# Patient Record
Sex: FEMALE | Race: WHITE | Employment: FULL TIME | URBAN - METROPOLITAN AREA
[De-identification: names, ages, dates, MRNs, and addresses within clinical notes are randomized per-mention and may not be internally consistent; named-entity substitution may affect disease eponyms.]

---

## 2017-11-01 ENCOUNTER — HOSPITAL ENCOUNTER (OUTPATIENT)
Dept: PHYSICAL THERAPY | Age: 52
Discharge: HOME | End: 2017-11-01
Payer: COMMERCIAL

## 2017-11-01 PROCEDURE — 97162 PT EVAL MOD COMPLEX 30 MIN: CPT

## 2017-11-01 PROCEDURE — 97110 THERAPEUTIC EXERCISES: CPT
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
## 2017-11-01 NOTE — PROGRESS NOTES
PHYSICAL THERAPY - DAILY TREATMENT NOTE    Patient Name: Blank Kline        Date: 2017  : 1965    Patient  Verified: yes  Visit #:   1     Insurance: Payor: Tilman Bad / Plan: [address] / Product Type: Commerical /      In time: 310 Out time: 355   Total Treatment Time: 45     Medicare Time Tracking (below)   Total Timed Codes (min):   1:1 Treatment Time:       TREATMENT AREA =  Neck pain [M54.2]  Left shoulder pain [M25.512]  Concussion [S06.0X9A]    SUBJECTIVE  Pain Level (on 0 to 10 scale):  4  / 10   Medication Changes/New allergies or changes in medical history, any new surgeries or procedures?     NO    If yes, update Summary List   Subjective Functional Status/Changes:  []  No changes reported     See EVAL/ POC         OBJECTIVE  Modalities Rationale: to decrease pain, edema, neural compromise in order to perform ADLs/ rest with improved ease        min [] Estim, type/location:                                      []  att     []  unatt     []  w/US     []  w/ice    []  w/heat    min []  Mechanical Traction: type/lbs                   []  pro   []  sup   []  int   []  cont    []  before manual    []  after manual    min []  Ultrasound, settings/location:      min []  Iontophoresis w/ dexamethasone, location:                                               []  take home patch       []  in clinic    min []  Ice     []  Heat    location/position:     min []  Vasopneumatic Device, press/temp:     min []  Other:    [] Skin assessment post-treatment (if applicable):    []  intact    []  redness- no adverse reaction     []redness  adverse reaction:        20 min Therapeutic Exercise:  [x]  See flow sheet   Rationale:      increase ROM and increase strength to improve the patients ability to perform ADLs      min Manual Therapy:    Rationale:      decrease pain, increase ROM, increase tissue extensibility and decrease trigger points to improve patient's ability to perform ADLs     min Neuromuscular Re-ed:    Rationale:    improve coordination, improve balance, increase proprioception and improved posture, improve motor control to improve the patients ability to perform ADLs     min Gait Training:    Rationale:       min Patient Education:  YES  Reviewed HEP   []  Progressed/Changed HEP based on:         Other Objective/Functional Measures:    See EVAL/ POC     Post Treatment Pain Level (on 0 to 10) scale:   4 / 10     ASSESSMENT      [x]  See POC     PLAN  [x]  Upgrade activities as tolerated  Continue plan of care: yes   []  Discharge due to :    []  Other:      Therapist: Sadie Brandt PT    Date: 11/1/2017 Time: 5:53 PM     Future Appointments  Date Time Provider Josie Solis   11/6/2017 2:30 PM Sadie Brandt PT REHAB CENTER AT [facility]   11/8/2017 3:00 PM Jl Du REHAB CENTER AT [facility]   11/13/2017 3:00 PM Jl Du REHAB CENTER AT [facility]   11/15/2017 3:30 PM Sadie Brandt PT REHAB CENTER AT [facility]   11/21/2017 3:30 PM Jl Du REHAB CENTER AT [facility]   11/27/2017 3:30 PM Brant Matthews PT REHAB CENTER AT [facility]   11/29/2017 3:30 PM Sadie Brandt PT REHAB CENTER AT [facility]

## 2017-11-06 ENCOUNTER — HOSPITAL ENCOUNTER (OUTPATIENT)
Dept: PHYSICAL THERAPY | Age: 52
Discharge: HOME | End: 2017-11-06
Payer: COMMERCIAL

## 2017-11-06 PROCEDURE — 97140 MANUAL THERAPY 1/> REGIONS: CPT

## 2017-11-06 PROCEDURE — 97110 THERAPEUTIC EXERCISES: CPT

## 2017-11-06 NOTE — PROGRESS NOTES
PHYSICAL THERAPY - DAILY TREATMENT NOTE    Patient Name: Elaine Alicea        Date: 2017  : 1965   YES Patient  Verified  Visit #:   2   of     Insurance: Payor: [employer] / Plan: [address] PT / Product Type: Commerical /      In time: 235 Out time: 330   Total Treatment Time: 55     Medicare Time Tracking (below)   Total Timed Codes (min):   1:1 Treatment Time:       TREATMENT AREA = Neck pain [M54.2]  Left shoulder pain [M25.512]  Low back pain [M54.5]    SUBJECTIVE  Pain Level (on 0 to 10 scale):  3  / 10   Medication Changes/New allergies or changes in medical history, any new surgeries or procedures?     NO    If yes, update Summary List   Subjective Functional Status/Changes:  []  No changes reported     LBP hurts more than other parts        OBJECTIVE    25 min Therapeutic Exercise:  [x]  See flow sheet   Rationale:      increase ROM and increase strength to improve the patients ability to perform ADLs with less pain     20 min Manual Therapy: Technique:      [x] STM[]IASTM[x]TPR[]PROM[] Stretching  [] SOR[] man traction[]   []OP with REIL  []Jt manipulation []Gr I [] II []  III [] IV[] V[]    Treatment Area:  Neck/ back   Rationale:      decrease pain, increase ROM, increase tissue extensibility and decrease trigger points to improve patient's ability to perform adls with less pain        Modalities Rationale:     decrease pain and increase tissue extensibility to improve patient's ability to relax   min [] Estim, type/location:                                      []  att     []  unatt     []  w/US     []  w/ice    []  w/heat    min []  Mechanical Traction: type/lbs                   []  pro   []  sup   []  int   []  cont    []  before manual    []  after manual    min []  Ultrasound, settings/location:      min []  Iontophoresis w/ dexamethasone, location:                                               []  take home patch       []  in clinic   10 min []  Ice     [x]  Heat location/position: Neck/ back    min []  Vasopneumatic Device, press/temp:     min []  Other:    [x] Skin assessment post-treatment (if applicable):    [x]  intact    [x]  redness- no adverse reaction     []redness  adverse reaction:         min Gait Training:    Rationale:        throughout Rx min Patient Education:  YES  Reviewed HEP   []  Progressed/Changed HEP based on: Other Objective/Functional Measures:    Multiple TPs, part in L scap area     Post Treatment Pain Level (on 0 to 10) scale:   4-5  / 10     ASSESSMENT  Assessment/Changes in Function:     Functional improvement:  Progressed HEP   Functional limitation:  Persistent pain with ADLs      []  See Progress Note/Recertification   Patient will continue to benefit from skilled PT services to modify and progress therapeutic interventions, address functional mobility deficits, address ROM deficits, address strength deficits, analyze and address soft tissue restrictions and analyze and cue movement patterns to attain remaining goals.    Progress toward goals / Updated goals:    Progressed HEP     PLAN  [x]  Upgrade activities as tolerated YES Continue plan of care   []  Discharge due to :    []  Other:      Therapist: Cesar Oconnor PT    Date: 11/6/2017 Time: 2:37 PM     Future Appointments  Date Time Provider Josie Solis   11/8/2017 3:00 PM Mikaela Alvarado REHAB CENTER AT [facility]   11/13/2017 3:00 PM Mikaela Alvarado REHAB CENTER AT [facility]   11/15/2017 3:30 PM Cesar Oconnor PT REHAB CENTER AT [facility]   11/21/2017 3:30 PM Mikaela Alvarado REHAB CENTER AT [facility]   11/27/2017 3:30 PM Chiquis Santiago PT REHAB CENTER AT [facility]   11/29/2017 3:30 PM Cesar Oconnor PT REHAB CENTER AT [facility]

## 2017-11-08 ENCOUNTER — APPOINTMENT (OUTPATIENT)
Dept: PHYSICAL THERAPY | Age: 52
End: 2017-11-08
Payer: COMMERCIAL

## 2017-11-10 ENCOUNTER — HOSPITAL ENCOUNTER (OUTPATIENT)
Dept: PHYSICAL THERAPY | Age: 52
Discharge: HOME | End: 2017-11-10
Payer: COMMERCIAL

## 2017-11-10 PROCEDURE — 97110 THERAPEUTIC EXERCISES: CPT

## 2017-11-10 PROCEDURE — 97140 MANUAL THERAPY 1/> REGIONS: CPT
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
## 2017-11-13 ENCOUNTER — HOSPITAL ENCOUNTER (OUTPATIENT)
Dept: PHYSICAL THERAPY | Age: 52
Discharge: HOME | End: 2017-11-13
Payer: COMMERCIAL

## 2017-11-13 PROCEDURE — 97110 THERAPEUTIC EXERCISES: CPT

## 2017-11-13 PROCEDURE — 97140 MANUAL THERAPY 1/> REGIONS: CPT

## 2017-11-13 NOTE — PROGRESS NOTES
[facility] PHYSICAL THERAPY AT [address], [address] [place] [address], [address], [address] [place] [address]  Phone: [phone]  Fax: [phone]  PROGRESS NOTE  Patient Name: Anamika Santos :    Treatment/Medical Diagnosis: Neck pain [M54.2]  Left shoulder pain [M25.512]  Low back pain [M54.5]   Referral Source: Alida Gonzales DO     Date of Initial Visit: 17 Attended Visits: 4 Missed Visits: -     SUMMARY OF TREATMENT  PT has consisted of manual therapy, therapeutic exercise, modalities for pain relief and patient education of HEP, activity modification, posture/ body mechanics  CURRENT STATUS  Patient reports 60%improvement in neck symptoms, 50% improvement in L shoulder pain and no improvement with LBP. FOTO score is rated at 82% (up from 60% at initial assessment.)   She demonstrates improved neck and shoulder mobility as well as LB extension. Current ROM: Cx ROM: flex=50, ext= 53, R/L SB= 31, 30, R/L rot= 64/66  Lx: ROM: flex= 50%. Ext= 75%, bilat rot= 50%    Progress remains steady. Previous Goals:  1 Patient will report >= 25% improvement in symptoms with ADLs  2 Patient will be educated in appropriate ROM, stabilization, strengthening exercises. Prior Level/Current Level:  1) Prior Level: na   Current Level: 60% for neck pain, 50% for L shoulder pain, 0% for LBP   Goal Met? Partially met  2) Prior Level: na   Current Level: Patient reports undewrstanding and compliance with current HEP. Written instructions provided   Goal Met? yes      New Goals to be achieved in __2-4__  weeks:  1 Patient to report >= 75% improvement in symptoms with ADLs. 2 Patient will be independent with finalized HEP/ self maintenance. 3 Increase FOTO score >= 76% to indicate improved function with use of CS.  4 Restore full AROM for improved ADL participation  RECOMMENDATIONS  Continue PT per current POC    If you have any questions/comments please contact us directly at [phone]. Thank you for allowing us to assist in the care of your patient. Therapist Signature: Lenora Reina PT Date: 11/13/2017     Time: 3:50 PM   NOTE TO PHYSICIAN:  PLEASE COMPLETE THE ORDERS BELOW AND FAX TO   [facility] Physical Therapy at [address]: [phone]. If you are unable to process this request in 24 hours please contact our office: [phone].    ___ I have read the above report and request that my patient continue as recommended.   ___ I have read the above report and request that my patient continue therapy with the following changes/special instructions:_________________________________________________________   ___ I have read the above report and request that my patient be discharged from therapy.      Physician Signature:        Date:       Time:

## 2017-11-13 NOTE — PROGRESS NOTES
PHYSICAL THERAPY - DAILY TREATMENT NOTE    Patient Name: Marlin Rodriguez        Date: 2017  : 1965   YES Patient  Verified  Visit #:   4     Insurance: Payor: Romeo Funk / Plan: [address] [place] [id number] [place] PT / Product Type: Commerical /      In time: 315 Out time: 410   Total Treatment Time: 55     Medicare Time Tracking (below)   Total Timed Codes (min):   1:1 Treatment Time:       TREATMENT AREA = Neck pain [M54.2]  Left shoulder pain [M25.512]  Low back pain [M54.5]    SUBJECTIVE  Pain Level (on 0 to 10 scale):  2  / 10   Medication Changes/New allergies or changes in medical history, any new surgeries or procedures? NO    If yes, update Summary List   Subjective Functional Status/Changes:  []  No changes reported     Hip has hurt worse    Cx: 60% improved,   Lx: no change, L shoulder: 50%         OBJECTIVE    35 min Therapeutic Exercise:  [x]  See flow sheet   Rationale:      increase ROM and increase strength to improve the patients ability to perform ADLs/ work activity with less pain     20 min Manual Therapy: Technique:      [x] STM[]IASTM[x]TPR[]PROM[x] Stretching- neck/ hips  [] SOR[] man traction[]   []OP with REIL  []Jt manipulation []Gr I [] II []  III [] IV[] V[]    Treatment Area:  CS/ LS   Rationale:      decrease pain, increase ROM, increase tissue extensibility and decrease trigger points to improve patient's ability to move with less pain               min Gait Training:    Rationale:        throughout Rx min Patient Education:  YES  Reviewed HEP   []  Progressed/Changed HEP based on: Other Objective/Functional Measures:    Cx ROM: flex=50, ext= 53, R/L SB= 31, 30, R/L rot= 64/66  Lx: ROM: flex= 50%.  Ext= 75%, bilat rot= 50%     Post Treatment Pain Level (on 0 to 10) scale:   0-4  / 10     ASSESSMENT  Assessment/Changes in Function:     Functional improvement:  Turning head to drive   Functional limitation:  LBP with ADLs      []  See Progress Note/Recertification Patient will continue to benefit from skilled PT services to modify and progress therapeutic interventions, address functional mobility deficits, address ROM deficits, address strength deficits, analyze and address soft tissue restrictions and analyze and cue movement patterns to attain remaining goals.    Progress toward goals / Updated goals:    See PN     PLAN  [x]  Upgrade activities as tolerated YES Continue plan of care   []  Discharge due to :    []  Other:      Therapist: Lenora Reina PT    Date: 11/13/2017 Time: 3:16 PM     Future Appointments  Date Time Provider Josie Solis   11/15/2017 3:30 PM Lenora Reina PT REHAB CENTER AT [facility]   11/21/2017 3:30 PM Alisia Bazan REH[facility]ENTER AT [facility]   11/27/2017 3:30 PM Ignacio Davis PT REHAB CENTER AT [facility]   11/29/2017 3:30 PM Lenora Reina, PT REHAB CENTER AT [facility]

## 2017-11-27 ENCOUNTER — HOSPITAL ENCOUNTER (OUTPATIENT)
Dept: PHYSICAL THERAPY | Age: 52
Discharge: HOME | End: 2017-11-27
Payer: COMMERCIAL

## 2017-11-27 PROCEDURE — 97140 MANUAL THERAPY 1/> REGIONS: CPT

## 2017-11-27 PROCEDURE — 97110 THERAPEUTIC EXERCISES: CPT

## 2017-11-27 NOTE — PROGRESS NOTES
PHYSICAL THERAPY - DAILY TREATMENT NOTE      Patient Name: Dianna Fitch        Date: 2017  : 1965   YES Patient  Verified  Visit #:   5   of     Insurance: Payor: Zora Hernández / Plan: [address] PT / Product Type: Commerical /      In time: 3:45 Out time: 4:38   Total Treatment Time: 53       TREATMENT AREA = Neck pain [M54.2]  Left shoulder pain [M25.512]  Low back pain [M54.5]    SUBJECTIVE    Pain Level (on 0 to 10 scale):  3- 10   Medication Changes/New allergies or changes in medical history, any new surgeries or procedures? NO    If yes, update Summary List   Subjective Functional Status/Changes:  []  No changes reported     Suboccipital pain and B medial border scapular pain       OBJECTIVE    38 min Therapeutic Exercise:  [x]  See flow sheet   Rationale:      increase ROM and increase strength to improve the patients ability to perform all ADL's       15 min Manual Therapy:  SOR. B mdeial border scapula STM   Rationale:      decrease pain to improve patient's ability to perform all ADL's without pain    Throughout Tx min Patient Education:  YES  Reviewed HEP   []  Progressed/Changed HEP based on: Other Objective/Functional Measures: Tolerated and completed entire ex program     Post Treatment Pain Level (on 0 to 10) scale:   3  / 10     ASSESSMENT    Assessment/Changes in Function:     No increased pain with exercise     []  See Progress Note/Recertification   Patient will continue to benefit from skilled PT services to modify and progress therapeutic interventions, address functional mobility deficits, address ROM deficits, address strength deficits, analyze and address soft tissue restrictions, analyze and cue movement patterns, analyze and modify body mechanics/ergonomics and assess and modify postural abnormalities to attain remaining goals.    Progress toward goals / Updated goals:     continue strengthening to meet all goals     PLAN    [x]  Upgrade activities as tolerated YES Continue plan of care   []  Discharge due to :    []  Other:      Therapist: Brant Matthews PT    Date: 11/27/2017 Time: 4:09 PM   Future Appointments  Date Time Provider Josie Solis   11/29/2017 3:30 PM Sadie Brandt PT REHAB CENTER AT [facility]

## 2017-11-29 ENCOUNTER — APPOINTMENT (OUTPATIENT)
Dept: PHYSICAL THERAPY | Age: 52
End: 2017-11-29
Payer: COMMERCIAL

## 2017-12-01 ENCOUNTER — HOSPITAL ENCOUNTER (OUTPATIENT)
Dept: PHYSICAL THERAPY | Age: 52
Discharge: HOME | End: 2017-12-01
Payer: COMMERCIAL

## 2017-12-01 PROCEDURE — 97110 THERAPEUTIC EXERCISES: CPT

## 2017-12-01 NOTE — PROGRESS NOTES
PHYSICAL THERAPY - DAILY TREATMENT NOTE    Patient Name: Va Trujillo        Date: 2017  : 1965   YES Patient  Verified  Visit #:   6     Insurance: Payor: [employer] / [facility] / Product Type: Commerical /      In time: 11:40am Out time: 12:40pm   Total Treatment Time: 60     Medicare Time Tracking (below)   Total Timed Codes (min):  na 1:1 Treatment Time:  na     TREATMENT AREA =  Neck pain [M54.2]  Left shoulder pain [M25.512]  Low back pain [M54.5]  SUBJECTIVE  Pain Level (on 0 to 10 scale):  0  / 10   Medication Changes/New allergies or changes in medical history, any new surgeries or procedures? NO    If yes, update Summary List   Subjective Functional Status/Changes:  []  No changes reported     Pt reports that MD cracked her back which felt good right after but she is a little sore today when doing LTR's. Noted HA are not as intense but patient continues to have aggravated TrP in between the shoulder blades.            OBJECTIVE  Modalities Rationale:     decrease inflammation, decrease pain and increase tissue extensibility to improve patient's ability to perform functional ADLs   min [] Estim, type/location:                                      []  att     []  unatt     []  w/US     []  w/ice    []  w/heat    min []  Mechanical Traction: type/lbs                   []  pro   []  sup   []  int   []  cont    []  before manual    []  after manual    min []  Ultrasound, settings/location:      min []  Iontophoresis w/ dexamethasone, location:                                               []  take home patch       []  in clinic   10 min []  Ice     [x]  Heat    location/position: Supine to L/S    min []  Vasopneumatic Device, press/temp:     min []  Other:    [] Skin assessment post-treatment (if applicable):    []  intact    []  redness- no adverse reaction     []redness  adverse reaction:        50 min Therapeutic Exercise:  [x]  See flow sheet   Rationale: increase ROM and increase strength to improve the patients ability to regain functional mobility of L/S for pain-free ADL's.     min Manual Therapy:    Rationale:      decrease pain, increase ROM, increase tissue extensibility and decrease trigger points to improve the patient's ability to regain full functional mobility     min Therapeutic Activity:    Rationale:    increase strength, improve coordination and increase proprioception to improve the patients ability to      min Neuromuscular Re-ed:    Rationale:    improve coordination, improve balance and increase proprioception to improve the patients ability to      min Gait Training:    Rationale:       min Patient Education:  YES  Reviewed HEP   []  Progressed/Changed HEP based on: Other Objective/Functional Measures: Added T-Band Rows/Ext on SB with march with emphasis on TA draw while maintaining proper posture.,     Post Treatment Pain Level (on 0 to 10) scale:   0  / 10     ASSESSMENT  Assessment/Changes in Function:   Less cueing needed today on promoting upright posture. Patient able to complete Therex without inc sx.    []  See Progress Note/Recertification   Patient will continue to benefit from skilled PT services to modify and progress therapeutic interventions, address functional mobility deficits, address ROM deficits, address strength deficits, analyze and address soft tissue restrictions, analyze and cue movement patterns, analyze and modify body mechanics/ergonomics and assess and modify postural abnormalities to attain remaining goals. Progress toward goals / Updated goals: · Short Term Goals: To be accomplished in  2  weeks:  1 Patient will report >= 25% improvement in symptoms with ADLs  2 Patient will be educated in appropriate ROM, stabilization, strengthening exercises.     · Long Term Goals: To be accomplished in  4-6  weeks:  1 Patient to report >= 75% improvement in symptoms with ADLs.   2 Patient will be independent with finalized HEP/ self maintenance. 3 Increase FOTO score >= 76% to indicate improved function with use of CS.  4 Restore full AROM for improved ADL participation. PLAN  [x]  Upgrade activities as tolerated YES Continue plan of care   []  Discharge due to :    []  Other:      Therapist: ZACH Rush    Date: 12/1/2017 Time: 12:51 PM     No future appointments.

## 2017-12-11 ENCOUNTER — HOSPITAL ENCOUNTER (OUTPATIENT)
Dept: PHYSICAL THERAPY | Age: 52
Discharge: HOME | End: 2017-12-11
Payer: COMMERCIAL

## 2017-12-11 PROCEDURE — 97110 THERAPEUTIC EXERCISES: CPT

## 2017-12-11 PROCEDURE — 97140 MANUAL THERAPY 1/> REGIONS: CPT

## 2017-12-11 NOTE — PROGRESS NOTES
PHYSICAL THERAPY - DAILY TREATMENT NOTE    Patient Name: Fanta Saunders        Date: 2017  : 1965   YES Patient  Verified  Visit #:     Insurance: Payor: [facility] / Plan: [address] PT / Product Type: Commerical /      In time: 230 Out time: 320   Total Treatment Time: 50     Medicare Time Tracking (below)   Total Timed Codes (min):   1:1 Treatment Time:       TREATMENT AREA = Neck pain [M54.2]  Left shoulder pain [M25.512]  Low back pain [M54.5]    SUBJECTIVE  Pain Level (on 0 to 10 scale):  5  / 10   Medication Changes/New allergies or changes in medical history, any new surgeries or procedures? NO    If yes, update Summary List   Subjective Functional Status/Changes:  []  No changes reported     Woke with HA and it has been there all day  Reports thoracic pain  Shoulder doing OK     Overall 85% better. OBJECTIVE    40 min Therapeutic Exercise:  [x]  See flow sheet   Rationale:      increase ROM and increase strength to improve the patients ability to perform ADLs with less pain     10 min Manual Therapy: Technique:      [] STM[]IASTM[]TPR[]PROM[x] Stretching  [x] SOR[] man traction[]   []OP with REIL  []Jt manipulation []Gr I [] II []  III [] IV[] V[]    Treatment Area:  CS/ temporal region   Rationale:      decrease pain, increase ROM, increase tissue extensibility and decrease trigger points to improve patient's ability to perform ADLs with less pain          throughout Rx min Patient Education:  YES  Reviewed HEP   []  Progressed/Changed HEP based on: Other Objective/Functional Measures:    Full Cx mobility     Post Treatment Pain Level (on 0 to 10) scale:   3  / 10     ASSESSMENT  Assessment/Changes in Function:     Functional improvement:  Improved ability to turn head for driving   Functional limitation:  Persistent hip pain. Has not returned to gym.   Avoids going to mall due to pain with prolonged waling      []  See Progress Note/Recertification Patient will continue to benefit from skilled PT services to modify and progress therapeutic interventions, address functional mobility deficits, address ROM deficits, address strength deficits, analyze and address soft tissue restrictions and analyze and cue movement patterns to attain remaining goals.    Progress toward goals / Updated goals:    Progressing steadily  Met LTG 1     PLAN  [x]  Upgrade activities as tolerated YES Continue plan of care   []  Discharge due to :    []  Other:      Therapist: Ezequiel Calderon PT    Date: 12/11/2017 Time: 2:32 PM     Future Appointments  Date Time Provider Josie Solis   12/13/2017 4:00 PM Ezequiel Calderon PT [facility]   12/18/2017 2:30 PM Ezequiel Calderon PT [facility]   12/20/2017 4:00 PM Ezequiel Calderon PT [facility] AT [facility]

## 2017-12-13 ENCOUNTER — HOSPITAL ENCOUNTER (OUTPATIENT)
Dept: PHYSICAL THERAPY | Age: 52
Discharge: HOME | End: 2017-12-13
Payer: COMMERCIAL

## 2017-12-13 PROCEDURE — 97140 MANUAL THERAPY 1/> REGIONS: CPT

## 2017-12-13 PROCEDURE — 97110 THERAPEUTIC EXERCISES: CPT

## 2017-12-13 NOTE — PROGRESS NOTES
PHYSICAL THERAPY - DAILY TREATMENT NOTE    Patient Name: Nia Shin        Date: 2017  : 1965   YES Patient  Verified  Visit #:   8   of     Insurance: Payor: Sreekanth Balbuena / Plan: [address] / Product Type: Commerical /      In time: 345 Out time: 430   Total Treatment Time: 45     Medicare Time Tracking (below)   Total Timed Codes (min):   1:1 Treatment Time:       TREATMENT AREA = Neck pain [M54.2]  Left shoulder pain [M25.512]  Low back pain [M54.5]    SUBJECTIVE  Pain Level (on 0 to 10 scale): 3  / 10   Medication Changes/New allergies or changes in medical history, any new surgeries or procedures? NO    If yes, update Summary List   Subjective Functional Status/Changes:  []  No changes reported      I'm good, but I still have a HA. Shoulder is fine. Requests to hold on LB/ hip ex's until she sees the doctor.   Discussed and will hold for today--re-address next week due to persistent hip/ groin pain    Reports nausea after last visit--then went to bed early       OBJECTIVE    25 min Therapeutic Exercise:  [x]  See flow sheet   Rationale:      increase ROM and increase strength to improve the patients ability to turn head/ reach     15 min Manual Therapy: Technique:      [] STM[]IASTM[x]TPR[]PROM[] Stretching  [x] SOR[x] man traction[]   []OP with REIL  []Jt manipulation []Gr I [] II []  III [] IV[] V[]    Treatment Area:CS     Rationale:      decrease pain, increase ROM, increase tissue extensibility and decrease trigger points to improve patient's ability to perform ADLs with less pain/ HA        Modalities Rationale:     decrease pain and increase tissue extensibility to improve patient's ability to perform ADLs with less pain/ HA   min [] Estim, type/location:                                      []  att     []  unatt     []  w/US     []  w/ice    []  w/heat   5 min [x]  Mechanical Traction: type/lbs 20#                  []  pro   []  sup   []  int   []  cont    [] before manual    []  after manual    min []  Ultrasound, settings/location:      min []  Iontophoresis w/ dexamethasone, location:                                               []  take home patch       []  in clinic    min []  Ice     []  Heat    location/position:     min []  Vasopneumatic Device, press/temp:     min []  Other:    [x] Skin assessment post-treatment (if applicable):    [x]  intact    [x]  redness- no adverse reaction     []redness  adverse reaction:         min Gait Training:    Rationale:        throughout Rx min Patient Education:  YES  Reviewed HEP   []  Progressed/Changed HEP based on: Other Objective/Functional Measures:    tender/ tone in subocc region    Trial of traction- discontinued at 5 minutes due to c/o nausea       Post Treatment Pain Level (on 0 to 10) scale:   3  / 10     ASSESSMENT  Assessment/Changes in Function:     Held LB ex at pt request     []  See Progress Note/Recertification   Patient will continue to benefit from skilled PT services to modify and progress therapeutic interventions, address functional mobility deficits, address ROM deficits, address strength deficits, analyze and address soft tissue restrictions and analyze and cue movement patterns to attain remaining goals.    Progress toward goals / Updated goals:    Slow but steady progress     PLAN  []  Upgrade activities as tolerated YES Continue plan of care   []  Discharge due to :    []  Other:      Therapist: Carole Ahumada, PT    Date: 12/13/2017 Time: 4:12 PM     Future Appointments  Date Time Provider Josie Solis   12/18/2017 2:30 PM Carole Ahumada, PT [facility] AT [facility]   12/20/2017 4:00 PM Carole Ahumada, PT [facility] AT [facility]

## 2017-12-18 ENCOUNTER — HOSPITAL ENCOUNTER (OUTPATIENT)
Dept: PHYSICAL THERAPY | Age: 52
Discharge: HOME | End: 2017-12-18
Payer: COMMERCIAL

## 2017-12-18 PROCEDURE — 97110 THERAPEUTIC EXERCISES: CPT

## 2017-12-18 PROCEDURE — 97140 MANUAL THERAPY 1/> REGIONS: CPT

## 2017-12-18 NOTE — PROGRESS NOTES
PHYSICAL THERAPY - DAILY TREATMENT NOTE    Patient Name: Hardeep Falk        Date: 2017  : 1965   YES Patient  Verified  Visit #:     Insurance: Payor: Tana Guzman / Plan: [address] / Product Type: Commerical /      In time: 240 Out time: 320   Total Treatment Time: 40     Medicare Time Tracking (below)   Total Timed Codes (min):   1:1 Treatment Time:       TREATMENT AREA = Neck pain [M54.2]  Left shoulder pain [M25.512]  Low back pain [M54.5]    SUBJECTIVE  Pain Level (on 0 to 10 scale):  0  / 10   Medication Changes/New allergies or changes in medical history, any new surgeries or procedures? NO    If yes, update Summary List   Subjective Functional Status/Changes:  []  No changes reported     I often limp after sitting a period of time      Overall 85-90% better. OBJECTIVE    20 min Therapeutic Exercise:  [x]  See flow sheet   Rationale:      increase ROM, increase strength and dec neural compromise to improve the patients ability to sit/ perform ADLs with less pain     20 min Manual Therapy: Technique:      [x] STM[]IASTM[x]TPR[]PROM[x] Stretching  [] SOR[] man traction[]   []OP with REIL  []Jt manipulation []Gr I [] II []  III [] IV[] V[]    Treatment Area:  CS/ LS   Rationale:      decrease pain, increase ROM, increase tissue extensibility and decrease trigger points to improve patient's ability to sit/ perform ADLs with less pain                   throughout Rx min Patient Education:  YES  Reviewed HEP   []  Progressed/Changed HEP based on: Other Objective/Functional Measures:    Trial of Lx extension due to recent c/o pain and difficulty rising following prolonged sitting. Ed pt on good sitting posture.   Held flex based LB ex    Full Cx rotation   Post Treatment Pain Level (on 0 to 10) scale:   0- LB, 3-4 CS  / 10     ASSESSMENT  Assessment/Changes in Function:       Functional limitation:  Difficulty with prolonged sitting      []  See Progress Note/Recertification   Patient will continue to benefit from skilled PT services to modify and progress therapeutic interventions, address functional mobility deficits, address ROM deficits, address strength deficits, analyze and address soft tissue restrictions, analyze and cue movement patterns and analyze and modify body mechanics/ergonomics to attain remaining goals.    Progress toward goals / Updated goals:    Meeting LTG 1     PLAN  [x]  Upgrade activities as tolerated YES Continue plan of care   []  Discharge due to :    []  Other:      Therapist: Gabriel Arguello PT    Date: 12/18/2017 Time: 2:42 PM     Future Appointments  Date Time Provider Josie Solis   12/20/2017 4:00 PM Gabriel Arguello PT [facility]

## 2017-12-20 ENCOUNTER — APPOINTMENT (OUTPATIENT)
Dept: PHYSICAL THERAPY | Age: 52
End: 2017-12-20
Payer: COMMERCIAL

## 2018-03-06 NOTE — PROGRESS NOTES
[facility] PHYSICAL THERAPY AT [address], [address] [place] [address], [address], [address] [place] [address]  Phone: [phone]  Fax: [phone]  DISCHARGE NOTE  Patient Name: Anamika Santos :    Treatment/Medical Diagnosis: Neck pain [M54.2]  Left shoulder pain [M25.512]  Low back pain [M54.5]   Referral Source: Alida Gonzales DO     Date of Initial Visit: 17 Attended Visits: 9 Missed Visits: -     SUMMARY OF TREATMENT  PT consisted of therapeutic exercise, manual therapy, modalities to include trial of Cx traction and patient education of HEP, posture and body mechanics  CURRENT STATUS  Pt progressed well through this course of PT, reporting 85-90% improvement with Cx/ shoulder Sx. She demonstrated full Cx ROM. Treatment for hip had been discontinued at patient request.     Previous Goals:  1 Patient to report >= 75% improvement in symptoms with ADLs. 2 Patient will be independent with finalized HEP/ self maintenance. 3 Increase FOTO score >= 76% to indicate improved function with use of CS.  4 Restore full AROM for improved ADL participation     Prior Level/Current Level:  1) Prior Level: 0   Current Level: 85-90%   Goal Met? yes  2) Prior Level: na   Current Level: indep   Goal Met? yes  3) Prior Level: 60%   Current Level: 81%   Goal Met? yes  4) Prior Level: Cx ROM: flex= 47, ext= 20, R?L SB= 30/20, R/L rot= 40/42 degrees   Current Level: full   Goal Met? yes    *  RECOMMENDATIONS  Discharge from PT- all LTGs met  If you have any questions/comments please contact us directly at [phone]. Thank you for allowing us to assist in the care of your patient.     Therapist Signature: Breanna Monte PT Date: 3/6/2018     Time: 1:05 PM